# Patient Record
Sex: FEMALE | Race: WHITE | Employment: OTHER | URBAN - METROPOLITAN AREA
[De-identification: names, ages, dates, MRNs, and addresses within clinical notes are randomized per-mention and may not be internally consistent; named-entity substitution may affect disease eponyms.]

---

## 2017-03-06 ENCOUNTER — TRANSCRIBE ORDERS (OUTPATIENT)
Dept: ADMINISTRATIVE | Facility: HOSPITAL | Age: 67
End: 2017-03-06

## 2017-03-06 DIAGNOSIS — M54.9 CHRONIC NECK AND BACK PAIN: Primary | ICD-10-CM

## 2017-03-06 DIAGNOSIS — G89.29 CHRONIC NECK AND BACK PAIN: Primary | ICD-10-CM

## 2017-03-06 DIAGNOSIS — M54.2 CHRONIC NECK AND BACK PAIN: Primary | ICD-10-CM

## 2017-04-04 ENCOUNTER — TRANSCRIBE ORDERS (OUTPATIENT)
Dept: NEUROLOGY | Facility: AMBULATORY SURGERY CENTER | Age: 67
End: 2017-04-04

## 2020-04-16 ENCOUNTER — TELEPHONE (OUTPATIENT)
Dept: UROLOGY | Facility: MEDICAL CENTER | Age: 70
End: 2020-04-16

## 2020-04-17 ENCOUNTER — TELEMEDICINE (OUTPATIENT)
Dept: UROLOGY | Facility: MEDICAL CENTER | Age: 70
End: 2020-04-17
Payer: COMMERCIAL

## 2020-04-17 DIAGNOSIS — N13.39 OTHER HYDRONEPHROSIS: Primary | ICD-10-CM

## 2020-04-17 DIAGNOSIS — N26.1 ATROPHIC KIDNEY, ACQUIRED: ICD-10-CM

## 2020-04-17 PROCEDURE — 99202 OFFICE O/P NEW SF 15 MIN: CPT | Performed by: UROLOGY

## 2022-10-10 ENCOUNTER — TELEPHONE (OUTPATIENT)
Dept: NEUROSURGERY | Facility: CLINIC | Age: 72
End: 2022-10-10

## 2022-10-10 NOTE — TELEPHONE ENCOUNTER
Patient has  Marzena Limon SCS was placed almost 4 years ago [facility]  Patient is looking to switch to [employer]   Entered a self-referral

## 2022-10-20 ENCOUNTER — APPOINTMENT (OUTPATIENT)
Dept: LAB | Facility: CLINIC | Age: 72
End: 2022-10-20
Payer: COMMERCIAL

## 2022-10-20 ENCOUNTER — OFFICE VISIT (OUTPATIENT)
Dept: NEUROSURGERY | Facility: CLINIC | Age: 72
End: 2022-10-20
Payer: COMMERCIAL

## 2022-10-20 ENCOUNTER — HOSPITAL ENCOUNTER (OUTPATIENT)
Dept: RADIOLOGY | Facility: HOSPITAL | Age: 72
Discharge: HOME | End: 2022-10-20
Attending: NEUROLOGICAL SURGERY
Payer: COMMERCIAL

## 2022-10-20 VITALS
HEIGHT: 67 IN | OXYGEN SATURATION: 98 % | BODY MASS INDEX: 21.66 KG/M2 | SYSTOLIC BLOOD PRESSURE: 132 MMHG | HEART RATE: 71 BPM | TEMPERATURE: 97.8 F | DIASTOLIC BLOOD PRESSURE: 76 MMHG | WEIGHT: 138 LBS

## 2022-10-20 DIAGNOSIS — G89.4 CHRONIC PAIN SYNDROME: ICD-10-CM

## 2022-10-20 DIAGNOSIS — Z45.42 BATTERY END OF LIFE OF SPINAL CORD STIMULATOR: ICD-10-CM

## 2022-10-20 DIAGNOSIS — G89.4 CHRONIC PAIN SYNDROME: Primary | ICD-10-CM

## 2022-10-20 LAB
ALBUMIN SERPL BCP-MCNC: 3.6 G/DL (ref 3.5–5)
ALP SERPL-CCNC: 48 U/L (ref 46–116)
ALT SERPL W P-5'-P-CCNC: 21 U/L (ref 12–78)
ANION GAP SERPL CALCULATED.3IONS-SCNC: 7 MMOL/L (ref 4–13)
APTT PPP: 32 SECONDS (ref 23–37)
AST SERPL W P-5'-P-CCNC: 22 U/L (ref 5–45)
ATRIAL RATE: 69 BPM
BACTERIA UR QL AUTO: ABNORMAL /HPF
BASOPHILS # BLD AUTO: 0.05 THOUSANDS/ÂΜL (ref 0–0.1)
BASOPHILS NFR BLD AUTO: 1 % (ref 0–1)
BILIRUB SERPL-MCNC: 0.44 MG/DL (ref 0.2–1)
BILIRUB UR QL STRIP: NEGATIVE
BUN SERPL-MCNC: 9 MG/DL (ref 5–25)
CALCIUM SERPL-MCNC: 9.6 MG/DL (ref 8.3–10.1)
CHLORIDE SERPL-SCNC: 104 MMOL/L (ref 96–108)
CLARITY UR: CLEAR
CO2 SERPL-SCNC: 26 MMOL/L (ref 21–32)
COLOR UR: COLORLESS
CREAT SERPL-MCNC: 0.89 MG/DL (ref 0.6–1.3)
EOSINOPHIL # BLD AUTO: 0.47 THOUSAND/ÂΜL (ref 0–0.61)
EOSINOPHIL NFR BLD AUTO: 7 % (ref 0–6)
ERYTHROCYTE [DISTWIDTH] IN BLOOD BY AUTOMATED COUNT: 12.5 % (ref 11.6–15.1)
EST. AVERAGE GLUCOSE BLD GHB EST-MCNC: 108 MG/DL
GFR SERPL CREATININE-BSD FRML MDRD: 65 ML/MIN/1.73SQ M
GLUCOSE P FAST SERPL-MCNC: 90 MG/DL (ref 65–99)
GLUCOSE UR STRIP-MCNC: NEGATIVE MG/DL
HBA1C MFR BLD: 5.4 %
HCT VFR BLD AUTO: 40.7 % (ref 34.8–46.1)
HGB BLD-MCNC: 12.7 G/DL (ref 11.5–15.4)
HGB UR QL STRIP.AUTO: NEGATIVE
IMM GRANULOCYTES # BLD AUTO: 0.01 THOUSAND/UL (ref 0–0.2)
IMM GRANULOCYTES NFR BLD AUTO: 0 % (ref 0–2)
INR PPP: 0.95 (ref 0.84–1.19)
KETONES UR STRIP-MCNC: NEGATIVE MG/DL
LEUKOCYTE ESTERASE UR QL STRIP: ABNORMAL
LYMPHOCYTES # BLD AUTO: 2.63 THOUSANDS/ÂΜL (ref 0.6–4.47)
LYMPHOCYTES NFR BLD AUTO: 40 % (ref 14–44)
MCH RBC QN AUTO: 30.7 PG (ref 26.8–34.3)
MCHC RBC AUTO-ENTMCNC: 31.2 G/DL (ref 31.4–37.4)
MCV RBC AUTO: 98 FL (ref 82–98)
MONOCYTES # BLD AUTO: 0.42 THOUSAND/ÂΜL (ref 0.17–1.22)
MONOCYTES NFR BLD AUTO: 6 % (ref 4–12)
NEUTROPHILS # BLD AUTO: 2.97 THOUSANDS/ÂΜL (ref 1.85–7.62)
NEUTS SEG NFR BLD AUTO: 46 % (ref 43–75)
NITRITE UR QL STRIP: NEGATIVE
NON-SQ EPI CELLS URNS QL MICRO: ABNORMAL /HPF
NRBC BLD AUTO-RTO: 0 /100 WBCS
P AXIS: 78 DEGREES
PH UR STRIP.AUTO: 6 [PH]
PLATELET # BLD AUTO: 265 THOUSANDS/UL (ref 149–390)
PMV BLD AUTO: 10.3 FL (ref 8.9–12.7)
POTASSIUM SERPL-SCNC: 4.3 MMOL/L (ref 3.5–5.3)
PR INTERVAL: 136 MS
PROT SERPL-MCNC: 7.4 G/DL (ref 6.4–8.4)
PROT UR STRIP-MCNC: NEGATIVE MG/DL
PROTHROMBIN TIME: 12.8 SECONDS (ref 11.6–14.5)
QRS AXIS: 75 DEGREES
QRSD INTERVAL: 82 MS
QT INTERVAL: 406 MS
QTC INTERVAL: 435 MS
RBC # BLD AUTO: 4.14 MILLION/UL (ref 3.81–5.12)
RBC #/AREA URNS AUTO: ABNORMAL /HPF
SODIUM SERPL-SCNC: 137 MMOL/L (ref 135–147)
SP GR UR STRIP.AUTO: 1.01 (ref 1–1.03)
T WAVE AXIS: 66 DEGREES
UROBILINOGEN UR STRIP-ACNC: <2 MG/DL
VENTRICULAR RATE: 69 BPM
WBC # BLD AUTO: 6.55 THOUSAND/UL (ref 4.31–10.16)
WBC #/AREA URNS AUTO: ABNORMAL /HPF

## 2022-10-20 PROCEDURE — 85025 COMPLETE CBC W/AUTO DIFF WBC: CPT

## 2022-10-20 PROCEDURE — 83036 HEMOGLOBIN GLYCOSYLATED A1C: CPT

## 2022-10-20 PROCEDURE — 85730 THROMBOPLASTIN TIME PARTIAL: CPT

## 2022-10-20 PROCEDURE — 72070 X-RAY EXAM THORAC SPINE 2VWS: CPT

## 2022-10-20 PROCEDURE — 85610 PROTHROMBIN TIME: CPT

## 2022-10-20 PROCEDURE — 72100 X-RAY EXAM L-S SPINE 2/3 VWS: CPT

## 2022-10-20 PROCEDURE — 80053 COMPREHEN METABOLIC PANEL: CPT

## 2022-10-20 PROCEDURE — 36415 COLL VENOUS BLD VENIPUNCTURE: CPT

## 2022-10-20 PROCEDURE — 99204 OFFICE O/P NEW MOD 45 MIN: CPT | Performed by: NEUROLOGICAL SURGERY

## 2022-10-20 PROCEDURE — 81001 URINALYSIS AUTO W/SCOPE: CPT | Performed by: NEUROLOGICAL SURGERY

## 2022-10-20 RX ORDER — TRAMADOL HYDROCHLORIDE 50 MG/1
TABLET ORAL
COMMUNITY
Start: 2022-10-05

## 2022-10-20 RX ORDER — CEFAZOLIN SODIUM 2 G/50ML
2000 SOLUTION INTRAVENOUS ONCE
OUTPATIENT
Start: 2022-10-20 | End: 2022-10-20

## 2022-10-20 RX ORDER — ACETAMINOPHEN 325 MG/1
975 TABLET ORAL ONCE
OUTPATIENT
Start: 2022-10-20 | End: 2022-10-20

## 2022-10-20 RX ORDER — GABAPENTIN 300 MG/1
300 CAPSULE ORAL ONCE
OUTPATIENT
Start: 2022-10-20 | End: 2022-10-20

## 2022-10-20 RX ORDER — GABAPENTIN 600 MG/1
600 TABLET ORAL 3 TIMES DAILY
COMMUNITY
Start: 2022-08-22

## 2022-10-20 RX ORDER — CHLORHEXIDINE GLUCONATE 0.12 MG/ML
15 RINSE ORAL ONCE
OUTPATIENT
Start: 2022-10-20 | End: 2022-10-20

## 2022-10-20 RX ORDER — DULOXETIN HYDROCHLORIDE 60 MG/1
60 CAPSULE, DELAYED RELEASE ORAL DAILY
COMMUNITY
Start: 2022-09-01

## 2022-10-20 RX ORDER — LISINOPRIL 10 MG/1
10 TABLET ORAL DAILY
COMMUNITY
Start: 2022-07-25

## 2022-10-20 RX ORDER — ALPRAZOLAM 1 MG/1
1 TABLET ORAL
COMMUNITY
Start: 2022-08-09

## 2022-10-20 NOTE — H&P (VIEW-ONLY)
Office Note - Neurosurgery   Yadi Butler 71 y o  female MRN: [id number]      Assessment:    Patient is stable  29-year-old woman with new vector spinal cord stimulator system for chronic pain syndrome  The system is not been functioning for 6 months, but was not particularly effective prior to this  The IPG site is now quite uncomfortable for her  We discussed reopening of left buttock incision for removal of spinal cord stimulator implantable pulse generator  We also discussed possible removal of the electrodes if they are percutaneous, but the patient declined this option after reviewing the relative risks  Her primary concern is being more comfortable around the IPG site  She understands that she would not be able to undergo MRI afterwards as her system would be incompletely removed  The goal of surgery is to improve comfort around the IPG site  The risks of surgery reviewed in detail  1  Risk of IV anesthetic, infection and bleeding  2  Risk of reoperation for explantation of the remaining system  The patient will not be able to undergo MRI in the future with a partial system in place  Expected postoperative course, including activity restrictions, expected pain and postoperative medication were reviewed  Patient provided verbal consent to surgical procedure and signed consent form: Yes    History, physical examination and diagnostic tests were reviewed and questions answered  Diagnosis, care plan and treatment options were discussed  The patient and spouse/SO understand instructions and will follow up as directed      Plan:    Follow-up:  Surgery    Problem List Items Addressed This Visit        Other    Battery end of life of spinal cord stimulator    Relevant Orders    Case request operating room: Reopening of left buttock incision for removal of spinal cord stimulator implantable pulse generator (Completed)      Other Visit Diagnoses     Chronic pain syndrome    -  Primary Relevant Orders    UA w Reflex to Microscopic w Reflex to Culture    Comprehensive metabolic panel    CBC and differential    APTT    Protime-INR    HEMOGLOBIN A1C W/ EAG ESTIMATION    EKG 12 lead    X-ray thoracic spine 2 views    XR spine lumbar 2 or 3 views injury    Case request operating room: Reopening of left buttock incision for removal of spinal cord stimulator implantable pulse generator (Completed)          Subjective/Objective     Chief Complaint    Discomfort around the left IPG site  HPI    Pleasant 78-year-old woman accompanied by her  today  She had a Nuvectra spinal cord stimulator system placed for chronic lower back and left leg pain approximately 6 years ago  While the trial was successful, the system itself was never truly effective in managing her pain  The battery was at end of life approximately 6 months ago  She presents today because she now has discomfort around the IPG site which she would like addressed by removing the IPG  She is not particularly interested in replacing the IPG with a new system  She is learned to cope with her lower back and left leg pain  LEANN NORIEGA personally reviewed and updated  Review of Systems   Constitutional: Negative  HENT: Negative  Eyes: Negative  Respiratory: Negative  Cardiovascular: Negative  Gastrointestinal: Negative  Endocrine: Negative  Genitourinary: Negative  Musculoskeletal: Positive for back pain (lbp radiating into left buttock, skips down to foot crosses over to arch of right foot) and gait problem (pain with walking, trouble with balance when "lightening bolt" pain hits, uses cane for stability)  SCS battery  about 6 months ago, was helping for pain   Skin: Negative  Allergic/Immunologic: Negative  Neurological: Positive for numbness (b/l feet when laying down)  Hematological: Negative  Psychiatric/Behavioral: Negative      All other systems reviewed and are negative        Family History    Family History   Problem Relation Age of Onset   • Hypertension Mother    • Colon cancer Mother    • Prostate cancer Father        Social History    Social History     Socioeconomic History   • Marital status: /Civil Union     Spouse name: Not on file   • Number of children: Not on file   • Years of education: Not on file   • Highest education level: Not on file   Occupational History   • Not on file   Tobacco Use   • Smoking status: Never Smoker   • Smokeless tobacco: Never Used   Substance and Sexual Activity   • Alcohol use: Not Currently   • Drug use: Not Currently   • Sexual activity: Not on file   Other Topics Concern   • Not on file   Social History Narrative   • Not on file     Social Determinants of Health     Financial Resource Strain: Not on file   Food Insecurity: Not on file   Transportation Needs: Not on file   Physical Activity: Not on file   Stress: Not on file   Social Connections: Not on file   Intimate Partner Violence: Not on file   Housing Stability: Not on file       Past Medical History    Past Medical History:   Diagnosis Date   • Back pain    • History of depression    • Hypertension        Surgical History    Past Surgical History:   Procedure Laterality Date   • DENTAL SURGERY     • NEPHRECTOMY Left 2020   • TONSILLECTOMY Bilateral        Medications      Current Outpatient Medications:   •  ALPRAZolam (XANAX) 1 mg tablet, Take 1 mg by mouth daily at bedtime, Disp: , Rfl:   •  DULoxetine (CYMBALTA) 60 mg delayed release capsule, Take 60 mg by mouth daily, Disp: , Rfl:   •  gabapentin (NEURONTIN) 600 MG tablet, Take 600 mg by mouth 3 (three) times a day, Disp: , Rfl:   •  lisinopril (ZESTRIL) 10 mg tablet, Take 10 mg by mouth daily, Disp: , Rfl:   •  traMADol (ULTRAM) 50 mg tablet, TAKE 1 TABLET BY MOUTH 3 TIMES DAILY AS NEEDED FOR SEVERE BREAKTHROUGH PAIN (UP TO 3 TIMES DAILY), Disp: , Rfl:     Allergies    Allergies   Allergen Reactions   • Ribavirin Hives and Other (See Comments)     brusing         The following portions of the patient's history were reviewed and updated as appropriate: allergies, current medications, past family history, past medical history, past social history, past surgical history and problem list     Physical Exam    Vitals:  Blood pressure 132/76, pulse 71, temperature 97 8 °F (36 6 °C), temperature source Temporal, height 5' 7" (1 702 m), weight 62 6 kg (138 lb), SpO2 98 %  ,Body mass index is 21 61 kg/m²  Physical Exam  Vitals reviewed  Constitutional:       General: She is not in acute distress  Eyes:      Extraocular Movements: Extraocular movements intact  Cardiovascular:      Rate and Rhythm: Normal rate and regular rhythm  Pulmonary:      Effort: Pulmonary effort is normal  No respiratory distress  Abdominal:      General: There is no distension  Skin:     General: Skin is warm and dry  Comments: Midline lumbar and left buttock incisions well healed  Neurological:      Mental Status: She is alert and oriented to person, place, and time  Comments: 5/5 power in lower extremities  Walks with a cane favoring the left leg  Psychiatric:         Mood and Affect: Mood normal          Behavior: Behavior normal        Neurologic Exam     Mental Status   Oriented to person, place, and time

## 2022-10-20 NOTE — PROGRESS NOTES
Office Note - Neurosurgery   Pepe Lundberg 71 y o  female MRN: [id number]      Assessment:    Patient is stable  78-year-old woman with new vector spinal cord stimulator system for chronic pain syndrome  The system is not been functioning for 6 months, but was not particularly effective prior to this  The IPG site is now quite uncomfortable for her  We discussed reopening of left buttock incision for removal of spinal cord stimulator implantable pulse generator  We also discussed possible removal of the electrodes if they are percutaneous, but the patient declined this option after reviewing the relative risks  Her primary concern is being more comfortable around the IPG site  She understands that she would not be able to undergo MRI afterwards as her system would be incompletely removed  The goal of surgery is to improve comfort around the IPG site  The risks of surgery reviewed in detail  1  Risk of IV anesthetic, infection and bleeding  2  Risk of reoperation for explantation of the remaining system  The patient will not be able to undergo MRI in the future with a partial system in place  Expected postoperative course, including activity restrictions, expected pain and postoperative medication were reviewed  Patient provided verbal consent to surgical procedure and signed consent form: Yes    History, physical examination and diagnostic tests were reviewed and questions answered  Diagnosis, care plan and treatment options were discussed  The patient and spouse/SO understand instructions and will follow up as directed      Plan:    Follow-up:  Surgery    Problem List Items Addressed This Visit        Other    Battery end of life of spinal cord stimulator    Relevant Orders    Case request operating room: Reopening of left buttock incision for removal of spinal cord stimulator implantable pulse generator (Completed)      Other Visit Diagnoses     Chronic pain syndrome    -  Primary Relevant Orders    UA w Reflex to Microscopic w Reflex to Culture    Comprehensive metabolic panel    CBC and differential    APTT    Protime-INR    HEMOGLOBIN A1C W/ EAG ESTIMATION    EKG 12 lead    X-ray thoracic spine 2 views    XR spine lumbar 2 or 3 views injury    Case request operating room: Reopening of left buttock incision for removal of spinal cord stimulator implantable pulse generator (Completed)          Subjective/Objective     Chief Complaint    Discomfort around the left IPG site  HPI    Pleasant 80-year-old woman accompanied by her  today  She had a Nuvectra spinal cord stimulator system placed for chronic lower back and left leg pain approximately 6 years ago  While the trial was successful, the system itself was never truly effective in managing her pain  The battery was at end of life approximately 6 months ago  She presents today because she now has discomfort around the IPG site which she would like addressed by removing the IPG  She is not particularly interested in replacing the IPG with a new system  She is learned to cope with her lower back and left leg pain  LEANN NORIEGA personally reviewed and updated  Review of Systems   Constitutional: Negative  HENT: Negative  Eyes: Negative  Respiratory: Negative  Cardiovascular: Negative  Gastrointestinal: Negative  Endocrine: Negative  Genitourinary: Negative  Musculoskeletal: Positive for back pain (lbp radiating into left buttock, skips down to foot crosses over to arch of right foot) and gait problem (pain with walking, trouble with balance when "lightening bolt" pain hits, uses cane for stability)  SCS battery  about 6 months ago, was helping for pain   Skin: Negative  Allergic/Immunologic: Negative  Neurological: Positive for numbness (b/l feet when laying down)  Hematological: Negative  Psychiatric/Behavioral: Negative      All other systems reviewed and are negative        Family History    Family History   Problem Relation Age of Onset   • Hypertension Mother    • Colon cancer Mother    • Prostate cancer Father        Social History    Social History     Socioeconomic History   • Marital status: /Civil Union     Spouse name: Not on file   • Number of children: Not on file   • Years of education: Not on file   • Highest education level: Not on file   Occupational History   • Not on file   Tobacco Use   • Smoking status: Never Smoker   • Smokeless tobacco: Never Used   Substance and Sexual Activity   • Alcohol use: Not Currently   • Drug use: Not Currently   • Sexual activity: Not on file   Other Topics Concern   • Not on file   Social History Narrative   • Not on file     Social Determinants of Health     Financial Resource Strain: Not on file   Food Insecurity: Not on file   Transportation Needs: Not on file   Physical Activity: Not on file   Stress: Not on file   Social Connections: Not on file   Intimate Partner Violence: Not on file   Housing Stability: Not on file       Past Medical History    Past Medical History:   Diagnosis Date   • Back pain    • History of depression    • Hypertension        Surgical History    Past Surgical History:   Procedure Laterality Date   • DENTAL SURGERY     • NEPHRECTOMY Left 2020   • TONSILLECTOMY Bilateral        Medications      Current Outpatient Medications:   •  ALPRAZolam (XANAX) 1 mg tablet, Take 1 mg by mouth daily at bedtime, Disp: , Rfl:   •  DULoxetine (CYMBALTA) 60 mg delayed release capsule, Take 60 mg by mouth daily, Disp: , Rfl:   •  gabapentin (NEURONTIN) 600 MG tablet, Take 600 mg by mouth 3 (three) times a day, Disp: , Rfl:   •  lisinopril (ZESTRIL) 10 mg tablet, Take 10 mg by mouth daily, Disp: , Rfl:   •  traMADol (ULTRAM) 50 mg tablet, TAKE 1 TABLET BY MOUTH 3 TIMES DAILY AS NEEDED FOR SEVERE BREAKTHROUGH PAIN (UP TO 3 TIMES DAILY), Disp: , Rfl:     Allergies    Allergies   Allergen Reactions   • Ribavirin Hives and Other (See Comments)     brusing         The following portions of the patient's history were reviewed and updated as appropriate: allergies, current medications, past family history, past medical history, past social history, past surgical history and problem list     Physical Exam    Vitals:  Blood pressure 132/76, pulse 71, temperature 97 8 °F (36 6 °C), temperature source Temporal, height 5' 7" (1 702 m), weight 62 6 kg (138 lb), SpO2 98 %  ,Body mass index is 21 61 kg/m²  Physical Exam  Vitals reviewed  Constitutional:       General: She is not in acute distress  Eyes:      Extraocular Movements: Extraocular movements intact  Cardiovascular:      Rate and Rhythm: Normal rate and regular rhythm  Pulmonary:      Effort: Pulmonary effort is normal  No respiratory distress  Abdominal:      General: There is no distension  Skin:     General: Skin is warm and dry  Comments: Midline lumbar and left buttock incisions well healed  Neurological:      Mental Status: She is alert and oriented to person, place, and time  Comments: 5/5 power in lower extremities  Walks with a cane favoring the left leg  Psychiatric:         Mood and Affect: Mood normal          Behavior: Behavior normal        Neurologic Exam     Mental Status   Oriented to person, place, and time

## 2022-11-10 NOTE — PRE-PROCEDURE INSTRUCTIONS
Pre-Surgery Instructions:   Medication Instructions   • ALPRAZolam (XANAX) 1 mg tablet Take night before surgery   • DULoxetine (CYMBALTA) 60 mg delayed release capsule Take day of surgery  • gabapentin (NEURONTIN) 600 MG tablet Take day of surgery  • lisinopril (ZESTRIL) 10 mg tablet Hold day of surgery  • Misc Natural Products (GLUCOSAMINE CHOND MSM FORMULA PO) Stop taking 7 days prior to surgery  Reviewed with patient, in detail, instructions from "My Surgical Experience"  Instructed to avoid all  OTC vitamins/supplements and NSAIDS from now until after  surgery per anesthesia guidelines  Tylenol ok to take PRN  Advised patient that Jose Linares will call with surgery arrival time and hospital directions the business day prior to surgery  Advised patient nothing eat or drink after midnight prior to surgery  Instructed to call surgeon's office in meantime with any new illnesses/exposure  Patient verbalized understanding of current visitor restrictions/masking guidelines and advised that he/she can confirm these at time of arrival call with Jose Linares  Patient verbalized understanding and knows to call surgeon's office with any additional questions prior to surgery

## 2022-11-17 ENCOUNTER — DOCUMENTATION (OUTPATIENT)
Dept: NEUROSURGERY | Facility: CLINIC | Age: 72
End: 2022-11-17

## 2022-11-18 ENCOUNTER — ANESTHESIA (OUTPATIENT)
Dept: PERIOP | Facility: HOSPITAL | Age: 72
End: 2022-11-18

## 2022-11-18 ENCOUNTER — ANESTHESIA EVENT (OUTPATIENT)
Dept: PERIOP | Facility: HOSPITAL | Age: 72
End: 2022-11-18

## 2022-11-18 ENCOUNTER — HOSPITAL ENCOUNTER (OUTPATIENT)
Facility: HOSPITAL | Age: 72
Setting detail: OUTPATIENT SURGERY
Discharge: HOME | End: 2022-11-18
Attending: NEUROLOGICAL SURGERY | Admitting: NEUROLOGICAL SURGERY

## 2022-11-18 VITALS
WEIGHT: 133.38 LBS | DIASTOLIC BLOOD PRESSURE: 69 MMHG | HEIGHT: 67 IN | RESPIRATION RATE: 12 BRPM | TEMPERATURE: 98.5 F | HEART RATE: 66 BPM | OXYGEN SATURATION: 94 % | SYSTOLIC BLOOD PRESSURE: 126 MMHG | BODY MASS INDEX: 20.93 KG/M2

## 2022-11-18 RX ORDER — SODIUM CHLORIDE 9 MG/ML
100 INJECTION, SOLUTION INTRAVENOUS CONTINUOUS
Status: DISCONTINUED | OUTPATIENT
Start: 2022-11-18 | End: 2022-11-18 | Stop reason: HOSPADM

## 2022-11-18 RX ORDER — ACETAMINOPHEN 325 MG/1
975 TABLET ORAL EVERY 8 HOURS PRN
Status: DISCONTINUED | OUTPATIENT
Start: 2022-11-18 | End: 2022-11-18 | Stop reason: HOSPADM

## 2022-11-18 RX ORDER — PROPOFOL 10 MG/ML
INJECTION, EMULSION INTRAVENOUS CONTINUOUS PRN
Status: DISCONTINUED | OUTPATIENT
Start: 2022-11-18 | End: 2022-11-18

## 2022-11-18 RX ORDER — ONDANSETRON 2 MG/ML
4 INJECTION INTRAMUSCULAR; INTRAVENOUS EVERY 6 HOURS PRN
Status: DISCONTINUED | OUTPATIENT
Start: 2022-11-18 | End: 2022-11-18 | Stop reason: HOSPADM

## 2022-11-18 RX ORDER — SODIUM CHLORIDE, SODIUM LACTATE, POTASSIUM CHLORIDE, CALCIUM CHLORIDE 600; 310; 30; 20 MG/100ML; MG/100ML; MG/100ML; MG/100ML
125 INJECTION, SOLUTION INTRAVENOUS CONTINUOUS
Status: DISCONTINUED | OUTPATIENT
Start: 2022-11-18 | End: 2022-11-18 | Stop reason: HOSPADM

## 2022-11-18 RX ORDER — LIDOCAINE HYDROCHLORIDE 10 MG/ML
0.5 INJECTION, SOLUTION EPIDURAL; INFILTRATION; INTRACAUDAL; PERINEURAL ONCE AS NEEDED
Status: DISCONTINUED | OUTPATIENT
Start: 2022-11-18 | End: 2022-11-18 | Stop reason: HOSPADM

## 2022-11-18 RX ORDER — HYDROMORPHONE HCL/PF 1 MG/ML
0.5 SYRINGE (ML) INJECTION
Status: DISCONTINUED | OUTPATIENT
Start: 2022-11-18 | End: 2022-11-18 | Stop reason: HOSPADM

## 2022-11-18 RX ORDER — TRAMADOL HYDROCHLORIDE 50 MG/1
50 TABLET ORAL EVERY 6 HOURS PRN
Status: DISCONTINUED | OUTPATIENT
Start: 2022-11-18 | End: 2022-11-18 | Stop reason: HOSPADM

## 2022-11-18 RX ORDER — PROPOFOL 10 MG/ML
INJECTION, EMULSION INTRAVENOUS AS NEEDED
Status: DISCONTINUED | OUTPATIENT
Start: 2022-11-18 | End: 2022-11-18

## 2022-11-18 RX ORDER — FENTANYL CITRATE/PF 50 MCG/ML
50 SYRINGE (ML) INJECTION
Status: DISCONTINUED | OUTPATIENT
Start: 2022-11-18 | End: 2022-11-18 | Stop reason: HOSPADM

## 2022-11-18 RX ORDER — LIDOCAINE HYDROCHLORIDE AND EPINEPHRINE 10; 10 MG/ML; UG/ML
INJECTION, SOLUTION INFILTRATION; PERINEURAL AS NEEDED
Status: DISCONTINUED | OUTPATIENT
Start: 2022-11-18 | End: 2022-11-18 | Stop reason: HOSPADM

## 2022-11-18 RX ORDER — GABAPENTIN 300 MG/1
300 CAPSULE ORAL ONCE
Status: COMPLETED | OUTPATIENT
Start: 2022-11-18 | End: 2022-11-18

## 2022-11-18 RX ORDER — ONDANSETRON 2 MG/ML
INJECTION INTRAMUSCULAR; INTRAVENOUS AS NEEDED
Status: DISCONTINUED | OUTPATIENT
Start: 2022-11-18 | End: 2022-11-18

## 2022-11-18 RX ORDER — ACETAMINOPHEN 325 MG/1
975 TABLET ORAL ONCE
Status: COMPLETED | OUTPATIENT
Start: 2022-11-18 | End: 2022-11-18

## 2022-11-18 RX ORDER — ONDANSETRON 2 MG/ML
4 INJECTION INTRAMUSCULAR; INTRAVENOUS ONCE AS NEEDED
Status: DISCONTINUED | OUTPATIENT
Start: 2022-11-18 | End: 2022-11-18 | Stop reason: HOSPADM

## 2022-11-18 RX ORDER — FENTANYL CITRATE 50 UG/ML
INJECTION, SOLUTION INTRAMUSCULAR; INTRAVENOUS AS NEEDED
Status: DISCONTINUED | OUTPATIENT
Start: 2022-11-18 | End: 2022-11-18

## 2022-11-18 RX ORDER — CEFAZOLIN SODIUM 2 G/50ML
2000 SOLUTION INTRAVENOUS ONCE
Status: COMPLETED | OUTPATIENT
Start: 2022-11-18 | End: 2022-11-18

## 2022-11-18 RX ORDER — CHLORHEXIDINE GLUCONATE 0.12 MG/ML
15 RINSE ORAL ONCE
Status: COMPLETED | OUTPATIENT
Start: 2022-11-18 | End: 2022-11-18

## 2022-11-18 RX ORDER — LIDOCAINE HYDROCHLORIDE 10 MG/ML
INJECTION, SOLUTION EPIDURAL; INFILTRATION; INTRACAUDAL; PERINEURAL AS NEEDED
Status: DISCONTINUED | OUTPATIENT
Start: 2022-11-18 | End: 2022-11-18

## 2022-11-18 RX ADMIN — PROPOFOL 100 MCG/KG/MIN: 10 INJECTION, EMULSION INTRAVENOUS at 11:49

## 2022-11-18 RX ADMIN — LIDOCAINE HYDROCHLORIDE 50 MG: 10 INJECTION, SOLUTION EPIDURAL; INFILTRATION; INTRACAUDAL; PERINEURAL at 11:49

## 2022-11-18 RX ADMIN — SODIUM CHLORIDE, POTASSIUM CHLORIDE, SODIUM LACTATE AND CALCIUM CHLORIDE 125 ML/HR: 600; 310; 30; 20 INJECTION, SOLUTION INTRAVENOUS at 10:59

## 2022-11-18 RX ADMIN — ACETAMINOPHEN 975 MG: 325 TABLET, FILM COATED ORAL at 10:59

## 2022-11-18 RX ADMIN — CEFAZOLIN SODIUM 2000 MG: 2 SOLUTION INTRAVENOUS at 11:49

## 2022-11-18 RX ADMIN — CHLORHEXIDINE GLUCONATE 15 ML: 1.2 SOLUTION ORAL at 10:59

## 2022-11-18 RX ADMIN — ONDANSETRON 4 MG: 2 INJECTION INTRAMUSCULAR; INTRAVENOUS at 11:49

## 2022-11-18 RX ADMIN — PROPOFOL 30 MG: 10 INJECTION, EMULSION INTRAVENOUS at 11:50

## 2022-11-18 RX ADMIN — FENTANYL CITRATE 25 MCG: 50 INJECTION INTRAMUSCULAR; INTRAVENOUS at 11:56

## 2022-11-18 RX ADMIN — GABAPENTIN 300 MG: 300 CAPSULE ORAL at 10:59

## 2022-11-18 RX ADMIN — FENTANYL CITRATE 25 MCG: 50 INJECTION INTRAMUSCULAR; INTRAVENOUS at 11:49

## 2022-11-18 RX ADMIN — PROPOFOL 50 MG: 10 INJECTION, EMULSION INTRAVENOUS at 11:49

## 2022-11-18 NOTE — DISCHARGE INSTRUCTIONS
Spine Day Surgery Discharge Instructions    Activity:    1  Do not lift more than 20 pounds for 2 weeks  Surgical incision care:    1  Keep dressing in place for 3 days  2  Keep incision dry for 3 days  3  May allow clean water to flow over incision after 3 days  4  Do not immerse the incision in water for 4 weeks  5  After 3 days, incision may be left open to air, but should remain clean  6  Do not apply any creams or ointments to the incision, unless otherwise instructed by [address] [place] [address]  7  Contact office if increasing redness, drainage, pain or swelling around the incision  Postoperative medication:    1  [employer] will not provide pain medication for the first 2 weeks after surgery as coordinated with your pain specialist    2  If [employer] is providing pain medication, please contact office for questions regarding dosage and modifications  3  If [facility] is not providing pain medication postoperatively, then contact pain specialist for additional instructions and prescriptions  4  Resume antiplatelet/anticoagulation medication 2 days after surgery, unless you notice new neurological symptoms or bleeding from incision  5  Do not operate heavy machinery or vehicles while taking sedating medications

## 2022-11-18 NOTE — ANESTHESIA PREPROCEDURE EVALUATION
Procedure:  Reopening of left buttock incision for removal of spinal cord stimulator implantable pulse generator (Left: Buttocks)    Relevant Problems   /RENAL   (+) Atrophic kidney, acquired   (+) Other hydronephrosis      Other   (+) Battery end of life of spinal cord stimulator        Physical Exam    Airway    Mallampati score: II  TM Distance: >3 FB  Neck ROM: full     Dental   No notable dental hx     Cardiovascular      Pulmonary      Other Findings        Anesthesia Plan  ASA Score- 2     Anesthesia Type- IV sedation with anesthesia with ASA Monitors  Additional Monitors:   Airway Plan:           Plan Factors-Exercise tolerance (METS): >4 METS  Chart reviewed  EKG reviewed  Existing labs reviewed  Patient is not a current smoker  Induction- intravenous  Postoperative Plan-     Informed Consent- Anesthetic plan and risks discussed with patient  I personally reviewed this patient with the CRNA  Discussed and agreed on the Anesthesia Plan with the CRNA             Lab Results   Component Value Date    GLUF 90 10/20/2022    ALT 21 10/20/2022    AST 22 10/20/2022    BUN 9 10/20/2022    CALCIUM 9 6 10/20/2022     10/20/2022    CO2 26 10/20/2022    CREATININE 0 89 10/20/2022    INR 0 95 10/20/2022    HCT 40 7 10/20/2022    HGB 12 7 10/20/2022    HGBA1C 5 4 10/20/2022     10/20/2022    K 4 3 10/20/2022    WBC 6 55 10/20/2022

## 2022-11-18 NOTE — ANESTHESIA POSTPROCEDURE EVALUATION
Post-Op Assessment Note    CV Status:  Stable  Pain Score: 0    Pain management: adequate     Mental Status:  Alert and awake   Hydration Status:  Euvolemic   PONV Controlled:  Controlled   Airway Patency:  Patent      Post Op Vitals Reviewed: Yes      Staff: Anesthesiologist, CRNA         No notable events documented      BP   114/59   Temp   97 9   Pulse  83   Resp   18   SpO2   99

## 2022-11-18 NOTE — INTERVAL H&P NOTE
H&P reviewed  After examining the patient I find no changes in the patients condition since the H&P had been written  Patient personally seen and examined  Neurological examination unchanged compared to last office/progress note, with the following exceptions:    /72   Pulse 75   Temp 97 6 °F (36 4 °C) (Oral)   Resp 16   Ht 5' 7" (1 702 m)   Wt 60 5 kg (133 lb 6 1 oz)   SpO2 95%   BMI 20 89 kg/m²      None  Regular cardiac rate and rhythm  No respiratory distress  Abdomen nontender  Normocephalic  Grossly full power in lower extremities  Has stopped antiplatelet/anticoagulation medication as instructed  Plain films of the thoracic and lumbar spine reviewed with the patient  The patient does have percutaneous leads in with midline lumbar anchors  We discussed proceeding with the plan of removing the IPG exclusively versus trying to remove the entire system which would necessitate incision over the midline lumbar spine  The relative risks and benefits alternatives to each were reviewed  All her questions were answered to her satisfaction  After some discussion, the patient would prefer to have the IPG removed exclusively today and leave the electrodes in place  She understands that she would not be able to undergo MRI with a partial system in place  Certainly the electrodes could be removed in the future if needed  Post operative instructions and medications have been reviewed with the patient  Assessment and Plan:    All questions have been answered to the patient satisfaction  Plan to proceed with reopening of left buttock incision for removal of implantable pulse generator for spinal cord stimulator  They are in agreement with proceeding

## 2022-11-18 NOTE — OP NOTE
OPERATIVE REPORT  PATIENT NAME: Jeff Antony    :  1950  MRN: [id number]  Pt Location:  OR ROOM 03    SURGERY DATE: 2022    Surgeon(s) and Role:     * Isidro Browning MD - Primary   No assistant  No qualified residents available  Preop Diagnosis:  Chronic pain syndrome [G89 4]  Battery end of life of spinal cord stimulator [Z45 42]    Post-Op Diagnosis Codes:     * Chronic pain syndrome [G89 4]     * Battery end of life of spinal cord stimulator [Z45 42]    Procedure:  1  Reopening of left buttock incision for removal of Nuvectra implantable pulse generator for spinal cord stimulator  Specimen(s):  * No specimens in log *    Estimated Blood Loss:   Minimal    Drains:  * No LDAs found *    Anesthesia Type:   IV Sedation with Anesthesia    Operative Indications:  Chronic pain syndrome [G89 4]  Battery end of life of spinal cord stimulator [Z45 42]    Operative Findings:  Removal of IPG in its entirety  Electrodes remain in place within the IPG pocket and thoracic spine  Complications:   None    Procedure and Technique:  Clinical Note:    The goals and alternatives to the procedure described above were discussed with patient  Surgery is intended to maintain therapeutic benefit of the system  Weakness, numbness and back pain are less likely to improve  The risks of surgery were described in detail  1  Risk of IV anesthetic  There is risk of infection and bleeding  2  Risk of system malfunction and failure of treatment  Need for revision surgery  Once all questions were answered to their satisfaction, they asked to proceed with surgery  Operating Room Note    The patient was brought to the operating room and asked to lie on the right side on the OR gurney  Care was taken to ensure that all pressure points were padded and the neck was in a neutral position  The left left incision was identified and the skin was prepped and draped in usual sterile fashion    A full surgical time-out was used to identify the site, side and type of surgery  It was also confirmed that the patient received preoperative antibiotic  The planned incision was then infiltrated 1% lidocaine with 100,000 epinephrine  Ten blade was used to incise the skin over the planned incision  Monopolar cautery was used to dissect through the subcutaneous tissue and identify the IPG pocket  The IPG was then removed from the pocket and disconnected from the leads  The distal leads were then replaced in the pocket  The incision was irrigated with antibiotic irrigation  Inverted Vicryl suture was used to approximate the subcutaneous tissues  Running Monocryl was then used to close the incision  A Miplex was applied to the incision  The count was correct at the end of the case and there were no complications  The patient was transferred to the PACU where they were noted to be hemodynamically stable and neurologically unchanged  The results of surgery were discussed with patient and family        I was present for the entire procedure    Patient Disposition:  PACU         SIGNATURE: Fartun Tinoco MD  DATE: November 18, 2022  TIME: 12:19 PM

## 2022-11-22 ENCOUNTER — TELEPHONE (OUTPATIENT)
Dept: NEUROSURGERY | Facility: CLINIC | Age: 72
End: 2022-11-22

## 2022-11-25 NOTE — TELEPHONE ENCOUNTER
2nd attempt - Called patient on primary contact number after surgery & recent discharge from the hospital to check in on recovery and provide post surgical instructions  Got voicemail, left message to callback  Will make 1 more attempt if no callback is received

## 2023-07-06 ENCOUNTER — TELEPHONE (OUTPATIENT)
Dept: NEPHROLOGY | Facility: CLINIC | Age: 73
End: 2023-07-06

## 2023-07-06 NOTE — TELEPHONE ENCOUNTER
New Patient Intake Form   Patient Details   Fernanda Powell     1950     [id number]     Insurance Information   Name of [address] [facility]   Does the patient need an insurance referral? NO   If patient has [employer], please ask if they will be using their [employer]. Appointment Information   Who is calling to schedule? If not patient, what is callers name? pt   Referring Provider  n/a   Reason for Appt (Diagnosis) Had one kidney removed & abnormal kidney function labs   Does Patient have labs/urine done at [facility]? If not, where do they go? List the date of last lab / urine  *Please try to get labs 2 years back if not at  NO, [facility]   Has patient been hospitalized recently? If yes, list name and location of hospital they were in NO   Has patient been seen by a Nephrologist before? If yes, list name, location and phone number NO   Has the patient had renal imaging done? If so, list the most recent date and type of imaging YES,  [facility] [place]  2021   Does patient have a history of Kidney Stones? YES   Appointment Details   Is there a referral on file?  NO   Appointment Date 12/11/23   Location [facility]   Miscellaneous   ADDED TO WAITLIST

## 2023-12-01 ENCOUNTER — TELEPHONE (OUTPATIENT)
Dept: NEPHROLOGY | Facility: CLINIC | Age: 73
End: 2023-12-01

## 2023-12-01 NOTE — TELEPHONE ENCOUNTER
Called pcp for recent labs. Office said the most recent labs they have are from 1/2023. She said she is going to fax over results.

## (undated) DEVICE — ANTIBACTERIAL VIOLET BRAIDED (POLYGLACTIN 910), SYNTHETIC ABSORBABLE SUTURE: Brand: COATED VICRYL

## (undated) DEVICE — PREP SURGICAL PURPREP 26ML

## (undated) DEVICE — SUT SILK 2-0 SH 30 IN K833H

## (undated) DEVICE — SPONGE PVP SCRUB WING STERILE

## (undated) DEVICE — FRAZIER SUCTION INSTRUMENT 18 FR W/OBTURATOR, NO CONTROL VENT: Brand: FRAZIER

## (undated) DEVICE — GLOVE SRG BIOGEL 8

## (undated) DEVICE — DRESSING MEPILEX AG BORDER 4 X 4 IN

## (undated) DEVICE — INTENDED FOR TISSUE SEPARATION, AND OTHER PROCEDURES THAT REQUIRE A SHARP SURGICAL BLADE TO PUNCTURE OR CUT.: Brand: BARD-PARKER SAFETY BLADES SIZE 10, STERILE

## (undated) DEVICE — TORQUE WRENCH

## (undated) DEVICE — ADHESIVE SKIN HIGH VISCOSITY EXOFIN 1ML

## (undated) DEVICE — DRAPE ADOLESCENT LAPAROTOMY

## (undated) DEVICE — BETHLEHEM UNIVERSAL MINOR GEN: Brand: CARDINAL HEALTH

## (undated) DEVICE — PENCIL ELECTROSURG E-Z CLEAN -0035H

## (undated) DEVICE — 3M™ IOBAN™ 2 ANTIMICROBIAL INCISE DRAPE 6640EZ: Brand: IOBAN™ 2

## (undated) DEVICE — PAD GROUNDING ADULT

## (undated) DEVICE — UTILITY MARKER,BLACK WITH LABELS: Brand: DEVON

## (undated) DEVICE — SUT MONOCRYL 4-0 PS-2 18 IN Y496G

## (undated) DEVICE — NEEDLE 25G X 1 1/2

## (undated) DEVICE — TUBING SUCTION 5MM X 12 FT

## (undated) DEVICE — BULB SYRINGE,IRRIGATION WITH PROTECTIVE CAP: Brand: DOVER

## (undated) DEVICE — GLOVE INDICATOR PI UNDERGLOVE SZ 8 BLUE